# Patient Record
Sex: MALE | Race: BLACK OR AFRICAN AMERICAN | URBAN - METROPOLITAN AREA
[De-identification: names, ages, dates, MRNs, and addresses within clinical notes are randomized per-mention and may not be internally consistent; named-entity substitution may affect disease eponyms.]

---

## 2017-08-02 ENCOUNTER — OFFICE VISIT (OUTPATIENT)
Dept: INTERNAL MEDICINE | Facility: CLINIC | Age: 37
End: 2017-08-02
Payer: COMMERCIAL

## 2017-08-02 ENCOUNTER — RADIANT APPOINTMENT (OUTPATIENT)
Dept: GENERAL RADIOLOGY | Facility: CLINIC | Age: 37
End: 2017-08-02
Attending: INTERNAL MEDICINE
Payer: COMMERCIAL

## 2017-08-02 VITALS
WEIGHT: 129.8 LBS | HEIGHT: 71 IN | HEART RATE: 78 BPM | TEMPERATURE: 98.3 F | DIASTOLIC BLOOD PRESSURE: 66 MMHG | BODY MASS INDEX: 18.17 KG/M2 | SYSTOLIC BLOOD PRESSURE: 104 MMHG | OXYGEN SATURATION: 98 %

## 2017-08-02 DIAGNOSIS — M25.561 PAIN IN BOTH KNEES, UNSPECIFIED CHRONICITY: ICD-10-CM

## 2017-08-02 DIAGNOSIS — Z00.00 ROUTINE GENERAL MEDICAL EXAMINATION AT A HEALTH CARE FACILITY: Primary | ICD-10-CM

## 2017-08-02 DIAGNOSIS — R53.83 FATIGUE, UNSPECIFIED TYPE: ICD-10-CM

## 2017-08-02 DIAGNOSIS — M25.562 PAIN IN BOTH KNEES, UNSPECIFIED CHRONICITY: ICD-10-CM

## 2017-08-02 LAB
ALBUMIN SERPL-MCNC: 4.3 G/DL (ref 3.4–5)
ALP SERPL-CCNC: 70 U/L (ref 40–150)
ALT SERPL W P-5'-P-CCNC: 28 U/L (ref 0–70)
ANION GAP SERPL CALCULATED.3IONS-SCNC: 4 MMOL/L (ref 3–14)
AST SERPL W P-5'-P-CCNC: 23 U/L (ref 0–45)
BILIRUB SERPL-MCNC: 0.4 MG/DL (ref 0.2–1.3)
BUN SERPL-MCNC: 11 MG/DL (ref 7–30)
CALCIUM SERPL-MCNC: 9.2 MG/DL (ref 8.5–10.1)
CHLORIDE SERPL-SCNC: 106 MMOL/L (ref 94–109)
CHOLEST SERPL-MCNC: 182 MG/DL
CO2 SERPL-SCNC: 30 MMOL/L (ref 20–32)
CREAT SERPL-MCNC: 0.74 MG/DL (ref 0.66–1.25)
ERYTHROCYTE [DISTWIDTH] IN BLOOD BY AUTOMATED COUNT: 12.1 % (ref 10–15)
GFR SERPL CREATININE-BSD FRML MDRD: NORMAL ML/MIN/1.7M2
GLUCOSE SERPL-MCNC: 84 MG/DL (ref 70–99)
HCT VFR BLD AUTO: 42.2 % (ref 40–53)
HDLC SERPL-MCNC: 68 MG/DL
HGB BLD-MCNC: 14 G/DL (ref 13.3–17.7)
LDLC SERPL CALC-MCNC: 100 MG/DL
MCH RBC QN AUTO: 29.2 PG (ref 26.5–33)
MCHC RBC AUTO-ENTMCNC: 33.2 G/DL (ref 31.5–36.5)
MCV RBC AUTO: 88 FL (ref 78–100)
NONHDLC SERPL-MCNC: 114 MG/DL
PLATELET # BLD AUTO: 152 10E9/L (ref 150–450)
POTASSIUM SERPL-SCNC: 4 MMOL/L (ref 3.4–5.3)
PROT SERPL-MCNC: 8.3 G/DL (ref 6.8–8.8)
RBC # BLD AUTO: 4.8 10E12/L (ref 4.4–5.9)
SODIUM SERPL-SCNC: 140 MMOL/L (ref 133–144)
TRIGL SERPL-MCNC: 69 MG/DL
TSH SERPL DL<=0.005 MIU/L-ACNC: 2.04 MU/L (ref 0.4–4)
WBC # BLD AUTO: 3.1 10E9/L (ref 4–11)

## 2017-08-02 PROCEDURE — 80053 COMPREHEN METABOLIC PANEL: CPT | Performed by: INTERNAL MEDICINE

## 2017-08-02 PROCEDURE — 80061 LIPID PANEL: CPT | Performed by: INTERNAL MEDICINE

## 2017-08-02 PROCEDURE — 85027 COMPLETE CBC AUTOMATED: CPT | Performed by: INTERNAL MEDICINE

## 2017-08-02 PROCEDURE — 84443 ASSAY THYROID STIM HORMONE: CPT | Performed by: INTERNAL MEDICINE

## 2017-08-02 PROCEDURE — 82306 VITAMIN D 25 HYDROXY: CPT | Performed by: INTERNAL MEDICINE

## 2017-08-02 PROCEDURE — 99395 PREV VISIT EST AGE 18-39: CPT | Performed by: INTERNAL MEDICINE

## 2017-08-02 PROCEDURE — 36415 COLL VENOUS BLD VENIPUNCTURE: CPT | Performed by: INTERNAL MEDICINE

## 2017-08-02 PROCEDURE — 73562 X-RAY EXAM OF KNEE 3: CPT | Mod: RT

## 2017-08-02 NOTE — PROGRESS NOTES
SUBJECTIVE:   CC: Madhav Ling is an 37 year old male who presents for preventative health visit.     Healthy Habits:    Do you get at least three servings of calcium containing foods daily (dairy, green leafy vegetables, etc.)? yes    Amount of exercise or daily activities, outside of work: None    Problems taking medications regularly not applicable    Medication side effects: No    Have you had an eye exam in the past two years? no    Do you see a dentist twice per year? no    Do you have sleep apnea, excessive snoring or daytime drowsiness?yes: daytime drowsiness          PROBLEMS TO ADD ON...    Concern for fatigue, no energy.   Feels also knees cracking with going up / down stairs.     Today's PHQ-2 Score: PHQ-2 ( 1999 Pfizer) 8/2/2017 5/26/2015   Q1: Little interest or pleasure in doing things 0 0   Q2: Feeling down, depressed or hopeless 0 0   PHQ-2 Score 0 0         Abuse: Current or Past(Physical, Sexual or Emotional)- No  Do you feel safe in your environment - Yes  Social History   Substance Use Topics     Smoking status: Never Smoker     Smokeless tobacco: Never Used     Alcohol use No     The patient does not drink >3 drinks per day nor >7 drinks per week.    Last PSA: No results found for: PSA    Reviewed orders with patient. Reviewed health maintenance and updated orders accordingly - Yes  Labs reviewed in EPIC    Reviewed and updated as needed this visit by clinical staff  Tobacco  Allergies  Meds  Med Hx  Surg Hx  Fam Hx  Soc Hx        Reviewed and updated as needed this visit by Provider              ROS:  C: NEGATIVE for fever, chills, change in weight  I: NEGATIVE for worrisome rashes, moles or lesions  E: NEGATIVE for vision changes or irritation  ENT: NEGATIVE for ear, mouth and throat problems  R: NEGATIVE for significant cough or SOB  CV: NEGATIVE for chest pain, palpitations or peripheral edema  GI: NEGATIVE for nausea, abdominal pain, heartburn, or change in bowel habits    "male: negative for dysuria, hematuria, decreased urinary stream, erectile dysfunction, urethral discharge  M: NEGATIVE for significant arthralgias or myalgia  N: NEGATIVE for weakness, dizziness or paresthesias  P: NEGATIVE for changes in mood or affect    OBJECTIVE:   /66  Pulse 78  Temp 98.3  F (36.8  C) (Oral)  Ht 5' 11.25\" (1.81 m)  Wt 129 lb 12.8 oz (58.9 kg)  SpO2 98%  BMI 17.98 kg/m2  EXAM:  GENERAL: healthy, alert and no distress  EYES: Eyes grossly normal to inspection, PERRL and conjunctivae and sclerae normal  HENT: ear canals and TM's normal, nose and mouth without ulcers or lesions  NECK: no adenopathy, no asymmetry, masses, or scars and thyroid normal to palpation  RESP: lungs clear to auscultation - no rales, rhonchi or wheezes  CV: regular rate and rhythm, normal S1 S2, no S3 or S4, no murmur, click or rub, no peripheral edema and peripheral pulses strong  ABDOMEN: soft, nontender, no hepatosplenomegaly, no masses and bowel sounds normal  MS: no gross musculoskeletal defects noted, no edema  SKIN: no suspicious lesions or rashes  NEURO: Normal strength and tone, mentation intact and speech normal  PSYCH: mentation appears normal, affect normal/bright    ASSESSMENT/PLAN:       ICD-10-CM    1. Routine general medical examination at a health care facility Z00.00 Lipid panel reflex to direct LDL     CBC with platelets     Comprehensive metabolic panel     TSH with free T4 reflex     Vitamin D Deficiency   2. Pain in both knees, unspecified chronicity M25.561 XR Knee Bilateral 3 Views    M25.562 Vitamin D Deficiency   3. Fatigue, unspecified type R53.83 CBC with platelets     Comprehensive metabolic panel     TSH with free T4 reflex     Vitamin D Deficiency       COUNSELING:  Reviewed preventive health counseling, as reflected in patient instructions       Regular exercise       Healthy diet/nutrition       Vision screening       Hearing screening       Colon cancer screening       Prostate " "cancer screening         reports that he has never smoked. He has never used smokeless tobacco.      Estimated body mass index is 17.98 kg/(m^2) as calculated from the following:    Height as of this encounter: 5' 11.25\" (1.81 m).    Weight as of this encounter: 129 lb 12.8 oz (58.9 kg).         Counseling Resources:  ATP IV Guidelines  Pooled Cohorts Equation Calculator  FRAX Risk Assessment  ICSI Preventive Guidelines  Dietary Guidelines for Americans, 2010  USDA's MyPlate  ASA Prophylaxis  Lung CA Screening    Johnson Hyman MD  [facility]  "

## 2017-08-02 NOTE — MR AVS SNAPSHOT
After Visit Summary   8/2/2017    Madhav Ling    MRN: [id number]           Patient Information     Date Of Birth          1980        Visit Information        Provider Department      8/2/2017 8:05 AM Johnson Hyman MD; [employer] [facility]        Today's Diagnoses     Routine general medical examination at a health care facility    -  1    Pain in both knees, unspecified chronicity        Fatigue, unspecified type          Care Instructions      Preventive Health Recommendations  Male Ages 26 - 39    Yearly exam:             See your health care provider every year in order to  o   Review health changes.   o   Discuss preventive care.    o   Review your medicines if your doctor has prescribed any.    You should be tested each year for STDs (sexually transmitted diseases), if you re at risk.     After age 35, talk to your provider about cholesterol testing. If you are at risk for heart disease, have your cholesterol tested at least every 5 years.     If you are at risk for diabetes, you should have a diabetes test (fasting glucose).  Shots: Get a flu shot each year. Get a tetanus shot every 10 years.     Nutrition:    Eat at least 5 servings of fruits and vegetables daily.     Eat whole-grain bread, whole-wheat pasta and brown rice instead of white grains and rice.     Talk to your provider about Calcium and Vitamin D.     Lifestyle    Exercise for at least 150 minutes a week (30 minutes a day, 5 days a week). This will help you control your weight and prevent disease.     Limit alcohol to one drink per day.     No smoking.     Wear sunscreen to prevent skin cancer.     See your dentist every six months for an exam and cleaning.             Follow-ups after your visit        Future tests that were ordered for you today     Open Future Orders        Priority Expected Expires Ordered    XR Knee Bilateral 3 Views Routine 8/2/2017 8/2/2018 8/2/2017           "  Who to contact     If you have questions or need follow up information about today's clinic visit or your schedule please contact [facility] directly at [phone].  Normal or non-critical lab and imaging results will be communicated to you by MyChart, letter or phone within 4 business days after the clinic has received the results. If you do not hear from us within 7 days, please contact the clinic through [employer] or phone. If you have a critical or abnormal lab result, we will notify you by phone as soon as possible.  Submit refill requests through [employer] or call your pharmacy and they will forward the refill request to us. Please allow 3 business days for your refill to be completed.          Additional Information About Your Visit        [employer] Information     [employer] lets you send messages to your doctor, view your test results, renew your prescriptions, schedule appointments and more. To sign up, go to [email or web address] . Click on \"Log in\" on the left side of the screen, which will take you to the Welcome page. Then click on \"Sign up Now\" on the right side of the page.     You will be asked to enter the access code listed below, as well as some personal information. Please follow the directions to create your username and password.     Your access code is: [email or web address]  Expires: 10/31/2017  9:11 AM     Your access code will  in 90 days. If you need help or a new code, please call your [facility] clinic or [phone].        Care EveryWhere ID     This is your Care EveryWhere ID. This could be used by other organizations to access your [facility] medical records  [id number]        Your Vitals Were     Pulse Temperature Height Pulse Oximetry BMI (Body Mass Index)       78 98.3  F (36.8  C) (Oral) 5' 11.25\" (1.81 m) 98% 17.98 kg/m2        Blood Pressure from Last 3 Encounters:   17 104/66   05/26/15 118/70    Weight from Last 3 Encounters:   17 129 lb 12.8 oz (58.9 " kg)   05/26/15 134 lb 3.2 oz (60.9 kg)              We Performed the Following     CBC with platelets     Comprehensive metabolic panel     Lipid panel reflex to direct LDL     TSH with free T4 reflex     Vitamin D Deficiency        Primary Care Provider Office Phone # Fax #    Johnson Hyman [phone]       [facility] [address] [facility] [address]        Equal Access to Services     LUBNA AMAYA : Hadii aad ku hadasho Soomaali, waaxda luqadaha, qaybta kaalmada adeegyada, waxay idiin hayaan adeeg kharash la'aan ah. So [facility] [phone].    ATENCIÓN: Si habla español, tiene a younger disposición servicios gratuitos de asistencia lingüística. Llame al [phone].    We comply with applicable federal civil rights laws and [place] laws. We do not discriminate on the basis of race, color, national origin, age, disability sex, sexual orientation or gender identity.            Thank you!     Thank you for choosing [facility]  for your care. Our goal is always to provide you with excellent care. Hearing back from our patients is one way we can continue to improve our services. Please take a few minutes to complete the written survey that you may receive in the mail after your visit with us. Thank you!             Your Updated Medication List - Protect others around you: Learn how to safely use, store and throw away your medicines at www.disposemymeds.org.      Notice  As of 8/2/2017  9:11 AM    You have not been prescribed any medications.

## 2017-08-02 NOTE — NURSING NOTE
"Chief Complaint   Patient presents with     Physical     Fasting.        Initial /66  Pulse 78  Temp 98.3  F (36.8  C) (Oral)  Ht 5' 11.25\" (1.81 m)  Wt 129 lb 12.8 oz (58.9 kg)  SpO2 98%  BMI 17.98 kg/m2 Estimated body mass index is 17.98 kg/(m^2) as calculated from the following:    Height as of this encounter: 5' 11.25\" (1.81 m).    Weight as of this encounter: 129 lb 12.8 oz (58.9 kg).  Medication Reconciliation: complete     Dina Zapata CMA      "

## 2017-08-02 NOTE — LETTER
[facility]  [address], [address]  [place], [place]  [address]                                            TEL:[phone]  FAX:[phone]      Madhav Ling  [address] [facility] [address]      August 7, 2017    Dear Madhav     Low vit D level. Suggest to take daily supplement 2000 units vit D3.   Slightly low WBC count , will recheck in 3 - 4 months.   Rest of the results are normal        Sincerely,      Johnson Hyman M.D.

## 2017-08-03 LAB — DEPRECATED CALCIDIOL+CALCIFEROL SERPL-MC: 17 UG/L (ref 20–75)

## 2017-08-16 ENCOUNTER — TELEPHONE (OUTPATIENT)
Dept: INTERNAL MEDICINE | Facility: CLINIC | Age: 37
End: 2017-08-16

## 2017-08-16 NOTE — TELEPHONE ENCOUNTER
Patient called asking for clarification on lab results and letter. Writer and patient went through the letter and the labs together. Writer educated patient on the labs and the meaning. Writer educate patient on foods to help maintain an optimal cholesterol level pre patient request. Patient verbalized understanding. Patient was advised where he can obtain vitamin D3 tablets. Patient had no further concerns.

## 2018-02-15 ENCOUNTER — HOSPITAL ENCOUNTER (EMERGENCY)
Facility: CLINIC | Age: 38
Discharge: HOME | End: 2018-02-15
Attending: EMERGENCY MEDICINE | Admitting: EMERGENCY MEDICINE

## 2018-02-15 ENCOUNTER — APPOINTMENT (OUTPATIENT)
Dept: GENERAL RADIOLOGY | Facility: CLINIC | Age: 38
End: 2018-02-15
Attending: EMERGENCY MEDICINE

## 2018-02-15 VITALS
WEIGHT: 186.5 LBS | SYSTOLIC BLOOD PRESSURE: 118 MMHG | RESPIRATION RATE: 15 BRPM | DIASTOLIC BLOOD PRESSURE: 79 MMHG | BODY MASS INDEX: 28.36 KG/M2 | HEART RATE: 82 BPM | TEMPERATURE: 98.4 F | OXYGEN SATURATION: 96 %

## 2018-02-15 DIAGNOSIS — R05.9 COUGH: ICD-10-CM

## 2018-02-15 LAB — TROPONIN I SERPL-MCNC: <0.015 UG/L (ref 0–0.04)

## 2018-02-15 PROCEDURE — 93005 ELECTROCARDIOGRAM TRACING: CPT | Performed by: EMERGENCY MEDICINE

## 2018-02-15 PROCEDURE — 99284 EMERGENCY DEPT VISIT MOD MDM: CPT | Mod: 25 | Performed by: EMERGENCY MEDICINE

## 2018-02-15 PROCEDURE — 84484 ASSAY OF TROPONIN QUANT: CPT | Performed by: EMERGENCY MEDICINE

## 2018-02-15 PROCEDURE — 71046 X-RAY EXAM CHEST 2 VIEWS: CPT

## 2018-02-15 PROCEDURE — 99285 EMERGENCY DEPT VISIT HI MDM: CPT | Mod: 25 | Performed by: EMERGENCY MEDICINE

## 2018-02-15 PROCEDURE — 93010 ELECTROCARDIOGRAM REPORT: CPT | Mod: Z6 | Performed by: EMERGENCY MEDICINE

## 2018-02-15 RX ORDER — ALBUTEROL SULFATE 90 UG/1
2 AEROSOL, METERED RESPIRATORY (INHALATION) EVERY 6 HOURS PRN
Qty: 1 INHALER | Refills: 0 | Status: SHIPPED | OUTPATIENT
Start: 2018-02-15

## 2018-02-15 ASSESSMENT — ENCOUNTER SYMPTOMS
COUGH: 1
CHILLS: 0
EYE REDNESS: 0
NECK PAIN: 0
CHEST TIGHTNESS: 1
ABDOMINAL PAIN: 0
VOMITING: 0
FEVER: 0
NECK STIFFNESS: 0
HEADACHES: 1
SHORTNESS OF BREATH: 1
ARTHRALGIAS: 0
CONFUSION: 0
NAUSEA: 0
DIFFICULTY URINATING: 0
COLOR CHANGE: 0

## 2018-02-15 NOTE — ED AVS SNAPSHOT
[facility], Emergency Department    [address]    [place] [address]    Phone:  [phone]    Fax:  [phone]                                       Michael Raymond   MRN: [id number]    Department:  [facility], Emergency Department   Date of Visit:  2/15/2018           Patient Information     Date Of Birth          1980        Your diagnoses for this visit were:     Cough        You were seen by Demario Gallegos MD.        Discharge Instructions       Please make an appointment to follow up with Your Primary Care Provider as soon as possible to discuss symptom and chest pain.  If you develop new or worsening chest pain please return to the emergency department for repeat evaluation.    Would recommend albuterol inhaler 2 puffs every 8 hours for symptomatic relief.  If no improvement can discontinue use.      24 Hour Appointment Hotline       To make an appointment at any [facility], call [phone] ([phone]). If you don't have a family doctor or clinic, we will help you find one. [facility] clinics are conveniently located to serve the needs of you and your family.             Review of your medicines      START taking        Dose / Directions Last dose taken    albuterol 108 (90 BASE) MCG/ACT Inhaler   Commonly known as:  PROAIR HFA/PROVENTIL HFA/VENTOLIN HFA   Dose:  2 puff   Quantity:  1 Inhaler        Inhale 2 puffs into the lungs every 6 hours as needed for shortness of breath / dyspnea or wheezing   Refills:  0          Our records show that you are taking the medicines listed below. If these are incorrect, please call your family doctor or clinic.        Dose / Directions Last dose taken    NYQUIL PO        Refills:  0        TYLENOL PO        Refills:  0                Prescriptions were sent or printed at these locations (1 Prescription)                   Other Prescriptions                Printed at Department/Unit printer (1 of 1)         albuterol (PROAIR  "HFA/PROVENTIL HFA/VENTOLIN HFA) 108 (90 BASE) MCG/ACT Inhaler                Procedures and tests performed during your visit     EKG 12-lead, tracing only    Troponin I    XR Chest 2 Views      Orders Needing Specimen Collection     None      Pending Results     Date and Time Order Name Status Description    2/15/2018 1821 EKG 12-lead, tracing only Preliminary             Pending Culture Results     No orders found from 2018 to 2018.            Pending Results Instructions     If you had any lab results that were not finalized at the time of your Discharge, you can call the ED Lab Result RN at [phone]. You will be contacted by this team for any positive Lab results or changes in treatment. The nurses are available 7 days a week from 10A to 6:30P.  You can leave a message 24 hours per day and they will return your call.        Thank you for choosing [facility]       Thank you for choosing [facility] for your care. Our goal is always to provide you with excellent care. Hearing back from our patients is one way we can continue to improve our services. Please take a few minutes to complete the written survey that you may receive in the mail after you visit with us. Thank you!        [employer] Information     [employer] lets you send messages to your doctor, view your test results, renew your prescriptions, schedule appointments and more. To sign up, go to [email or web address] . Click on \"Log in\" on the left side of the screen, which will take you to the Welcome page. Then click on \"Sign up Now\" on the right side of the page.     You will be asked to enter the access code listed below, as well as some personal information. Please follow the directions to create your username and password.     Your access code is: [email or web address]  Expires: 2018  8:14 PM     Your access code will  in 90 days. If you need help or a new code, please call your [facility] clinic or [phone].        Care EveryWhere ID     " This is your Care EveryWhere ID. This could be used by other organizations to access your [facility] medical records  [id number]        Equal Access to Services     SHELLY DOZIER : Benita Gonzalez, conner sanchez, celestino morley, eduin rollins. So [facility] [phone].    ATENCIÓN: Si habla español, tiene a hernadez disposición servicios gratuitos de asistencia lingüística. Llame al [phone].    We comply with applicable federal civil rights laws and [place] laws. We do not discriminate on the basis of race, color, national origin, age, disability, sex, sexual orientation, or gender identity.            After Visit Summary       This is your record. Keep this with you and show to your community pharmacist(s) and doctor(s) at your next visit.

## 2018-02-15 NOTE — ED AVS SNAPSHOT
[facility], Emergency Department    [address]    [facility] [place] [address]    Phone:  [phone]    Fax:  [phone]                                       Michael Raymond   MRN: [id number]    Department:  [facility], Emergency Department   Date of Visit:  2/15/2018           After Visit Summary Signature Page     I have received my discharge instructions, and my questions have been answered. I have discussed any challenges I see with this plan with the nurse or doctor.    ..........................................................................................................................................  Patient/Patient Representative Signature      ..........................................................................................................................................  Patient Representative Print Name and Relationship to Patient    ..................................................               ................................................  Date                                            Time    ..........................................................................................................................................  Reviewed by Signature/Title    ...................................................              ..............................................  Date                                                            Time

## 2018-02-16 LAB — INTERPRETATION ECG - MUSE: NORMAL

## 2018-02-16 NOTE — DISCHARGE INSTRUCTIONS
Please make an appointment to follow up with Your Primary Care Provider as soon as possible to discuss symptom and chest pain.  If you develop new or worsening chest pain please return to the emergency department for repeat evaluation.    Would recommend albuterol inhaler 2 puffs every 8 hours for symptomatic relief.  If no improvement can discontinue use.

## 2018-02-16 NOTE — ED PROVIDER NOTES
History     Chief Complaint   Patient presents with     Cough     Patient reports intermittent cough since Tuesday, reports increased cough with working/movement.  Has been taking Nyquil with minimal relief     HPI  Tonjagamagagan Raymond is a 38 year old male who presents to the ED for the evaluation of a nonproductive cough. The patient states that he has been coughing now for the past two days, and it is worse with exertion or going out in cold weather. No chest pain with this, but he does note that on 2/13/18, 2 days ago, he had an increase in cough concurrent with chest pressure and mild SOB, which was unusual. He has not since had that symptomatolgy associated with his cough, and he denies any ongoing SOB.  No other history of exertional chest pain.  He does have a headache associated with coughing, but he denies any neck pain or nuchal rigidity. No previous history of heart disease (either personal or family), HTN, hypercholesterolemia, or thromboembolism. No LE edema currently, and he denies any recent long travel or surgeries, no hemoptysis. The patient is not diabetic and he is a non smoker. No nausea or vomiting, fever, myalgias or arthralgias..      PAST MEDICAL HISTORY: History reviewed. No pertinent past medical history.    PAST SURGICAL HISTORY: History reviewed. No pertinent surgical history.    FAMILY HISTORY: No family history on file.    SOCIAL HISTORY:   Social History   Substance Use Topics     Smoking status: Never Smoker     Smokeless tobacco: Never Used     Alcohol use No       Patient's Medications   New Prescriptions    ALBUTEROL (PROAIR HFA/PROVENTIL HFA/VENTOLIN HFA) 108 (90 BASE) MCG/ACT INHALER    Inhale 2 puffs into the lungs every 6 hours as needed for shortness of breath / dyspnea or wheezing   Previous Medications    ACETAMINOPHEN (TYLENOL PO)        PSEUDOEPH-DOXYLAMINE-DM-APAP (NYQUIL PO)       Modified Medications    No medications on file   Discontinued Medications    BENZONATATE  (TESSALON) 200 MG CAPSULE    Take 1 capsule (200 mg) by mouth 3 times daily as needed for cough        No Known Allergies          I have reviewed the Medications, Allergies, Past Medical and Surgical History, and Social History in the Epic system.    Review of Systems   Constitutional: Negative for chills and fever.   HENT: Negative for congestion.    Eyes: Negative for redness.   Respiratory: Positive for cough (nonproductive), chest tightness (once, see HPI) and shortness of breath (once, see HPI).    Cardiovascular: Negative for chest pain.   Gastrointestinal: Negative for abdominal pain, nausea and vomiting.   Genitourinary: Negative for difficulty urinating.   Musculoskeletal: Negative for arthralgias, neck pain and neck stiffness.   Skin: Negative for color change.   Neurological: Positive for headaches.   Psychiatric/Behavioral: Negative for confusion.       Physical Exam   BP: 136/73  Pulse: 84  Temp: 97.5  F (36.4  C)  Resp: 16  Weight: 84.6 kg (186 lb 8 oz)  SpO2: 96 %      Physical Exam  General: awake, alert, NAD  Head: normal cephalic  HEENT: pupils equal, conjugate gaze in tact  Neck: Supple  CV: regular rate and rhythm without murmur  Lungs: clear to ascultation.  No rales rhonchi or wheezing.  No increased work of breathing  Abd: soft, non-tender, no guarding, no peritoneal signs  EXT: lower extremities without swelling or edema  Neuro: awake, answers questions appropriately. No focal deficits noted       ED Course     Results for orders placed or performed during the hospital encounter of 02/15/18 (from the past 24 hour(s))   Troponin I   Result Value Ref Range    Troponin I ES <0.015 0.000 - 0.045 ug/L   EKG 12-lead, tracing only   Result Value Ref Range    Interpretation ECG Click View Image link to view waveform and result    XR Chest 2 Views    Narrative    CHEST TWO VIEWS  2/15/2018 7:02 PM     HISTORY: Cough.    COMPARISON: None.      Impression    IMPRESSION: Shallow inspiration. No acute  infiltrates. Probably normal  heart size and pulmonary vascularity given the expiratory nature of  the radiographs.       Procedures             EKG Interpretation:      Interpreted by Demario Gallegos  Time reviewed: 1840  Symptoms at time of EKG: none  Rhythm: normal sinus   Rate: normal  Axis: normal  Ectopy: none  Conduction: normal  ST Segments/ T Waves: No ST-T wave changes  Q Waves: none  Comparison to prior: No old EKG available    Clinical Impression: No signs of acute ischemia          Critical Care time:  none       Labs Ordered and Resulted from Time of ED Arrival Up to the Time of Departure from the ED   TROPONIN I            Assessments & Plan (with Medical Decision Making)   Michael is a 38-year-old male without significant past medical history who presents with 2 days of nonproductive cough.  He did complain of one episode of shortness of breath and chest pressure 2 days ago that has not reoccurred. Differential of potentially life-threatening pathology would include ACS, PE, other potential underlying causes include viral URI, bacterial URI, CHF.    Patient is well-appearing.  Stable vital signs.  Does not endorse fever, myalgias arthralgias or other associated symptoms so  low suspicion for influenza or bacterial infection.  Did consider ACS as a potentially life-threatening pathology given his chest pressure 2 days ago though the patient has atypical history without significant risk factors for ACS.  EKG shows no signs of acute ischemia.  Troponin is negative.  Patient has a HEART score of 0 making him low risk for all cause mortality in the next 30 days so I think he is safe to discharge home and he can follow-up with primary care to see if further evaluation or risk statification is warranted.  Patient is PERC negative so very low suspicion that his cough is being secondary to PE.     Chest x-ray without evidence of cardiomegaly or fluid overload.  No signs of pneumonia or infection.    Given the  negative evaluation suspect this is largely related to viral URI will recommend symptomatic management will also prescribe an albuterol inhaler in case this helps with symptom management.  If the patient does not have any relief from his albuterol inhaler he can discontinue the use.  Would recommend he follows up with the primary care for repeat evaluation.  If he gets new or worsening chest pain or shortness of breath he needs to return to the emergency department.    I have reviewed the nursing notes.    I have reviewed the findings, diagnosis, plan and need for follow up with the patient.    New Prescriptions    ALBUTEROL (PROAIR HFA/PROVENTIL HFA/VENTOLIN HFA) 108 (90 BASE) MCG/ACT INHALER    Inhale 2 puffs into the lungs every 6 hours as needed for shortness of breath / dyspnea or wheezing       Final diagnoses:   Cough   I, Nick Juarez, am serving as a trained medical scribe to document services personally performed by Demario Gallegos MD, based on the provider's statements to me.      Demario GARCIA MD, was physically present and have reviewed and verified the accuracy of this note documented by Nick Juarez.       2/15/2018   [facility], EMERGENCY DEPARTMENT     Demario Gallegos MD  02/15/18 1941       Demario Gallegos MD  02/15/18 2023

## 2018-02-16 NOTE — ED NOTES
Patient reports intermittent cough since Tuesday, reports cough increases with activity/temperature changes.  Patient denies body aches, reports headache with coughing.  Patient reports BUDDY at times when coughing.

## 2025-01-03 ENCOUNTER — HOSPITAL ENCOUNTER (EMERGENCY)
Facility: CLINIC | Age: 45
Discharge: HOME | End: 2025-01-03
Attending: EMERGENCY MEDICINE | Admitting: EMERGENCY MEDICINE
Payer: COMMERCIAL

## 2025-01-03 VITALS
DIASTOLIC BLOOD PRESSURE: 73 MMHG | OXYGEN SATURATION: 100 % | HEART RATE: 88 BPM | BODY MASS INDEX: 19.55 KG/M2 | HEIGHT: 69 IN | SYSTOLIC BLOOD PRESSURE: 111 MMHG | WEIGHT: 132 LBS | RESPIRATION RATE: 16 BRPM | TEMPERATURE: 98.6 F

## 2025-01-03 DIAGNOSIS — J10.1 INFLUENZA A: ICD-10-CM

## 2025-01-03 LAB
FLUAV RNA SPEC QL NAA+PROBE: POSITIVE
FLUBV RNA RESP QL NAA+PROBE: NEGATIVE
RSV RNA SPEC NAA+PROBE: NEGATIVE
SARS-COV-2 RNA RESP QL NAA+PROBE: NEGATIVE

## 2025-01-03 PROCEDURE — 87637 SARSCOV2&INF A&B&RSV AMP PRB: CPT | Performed by: EMERGENCY MEDICINE

## 2025-01-03 PROCEDURE — 99283 EMERGENCY DEPT VISIT LOW MDM: CPT

## 2025-01-03 ASSESSMENT — ACTIVITIES OF DAILY LIVING (ADL)
ADLS_ACUITY_SCORE: 41
ADLS_ACUITY_SCORE: 41

## 2025-01-03 NOTE — ED PROVIDER NOTES
"  Emergency Department Note      History of Present Illness     Chief Complaint   Flu Symptoms      HPI   Madhav Ling is a 45 year old male who presents with concern of a cough, fever and bodyaches.  He has had this for 3 days.  He is a [employer] .  Patient has not taken anything for this.  He is otherwise healthy.  Patient did not get flu shot this year.    Independent Historian   None    Review of External Notes   none    Past Medical History     Medical History and Problem List   No past medical history on file.    Medications   No current outpatient medications on file.      Surgical History   No past surgical history on file.    Physical Exam     Patient Vitals for the past 24 hrs:   BP Temp Temp src Pulse Resp SpO2 Height Weight   01/03/25 1315 111/73 98.6  F (37  C) Oral 88 16 100 % 1.753 m (5' 9\") 59.9 kg (132 lb)     Physical Exam  General:  Sitting on bed.  HENT:  No obvious trauma to head  Right Ear:  External ear normal.   Left Ear:  External ear normal.   Nose:  Nose normal.   Eyes:  Conjunctivae and EOM are normal. Pupils are equal, round, and reactive.   Neck: Normal range of motion. Neck supple. No tracheal deviation present.   CV:  Normal heart sounds. No murmur heard.  Pulm/Chest: Effort normal and breath sounds normal.  No wheezing or coarse breath sounds bilateral.  Talking in full sentences.  M/S: Normal range of motion.   Neuro: Awake and alert.   Skin: Skin is warm and dry. No rash noted. Not diaphoretic.   Psych: Normal mood and affect. Behavior is normal.     Diagnostics     Lab Results   Labs Ordered and Resulted from Time of ED Arrival to Time of ED Departure   INFLUENZA A/B, RSV AND SARS-COV2 PCR - Abnormal       Result Value    Influenza A PCR Positive (*)     Influenza B PCR Negative      RSV PCR Negative      SARS CoV2 PCR Negative         Imaging   No orders to display       Independent Interpretation   None    ED Course      Medications Administered   Medications - No data to " display    Procedures   Procedures     Discussion of Management   None    ED Course        Additional Documentation  None    Medical Decision Making / Diagnosis     CMS Diagnoses: None    MIPS       None    MDM   Madhav Ling is a very pleasant 45 year old male who presents with concern of a fever, headache, body aches and cough.  Influenza A test is positive.  COVID and RSV are negative.  Patient is not hypoxic.  Lungs are clear bilateral so doubt secondary bacterial pneumonia.  I do not believe a chest x-ray is necessary at this time.  Patient has had symptoms for 3 days.  He is out of the Tamiflu window.  I recommended symptomatic treatment with Tylenol every 4 hours and ibuprofen every 6 hours.  Discussed reasons to return.  Patient had no additional concerns.  He has no meningeal signs to suggest meningitis.  No GI symptoms with this.  No nausea, vomiting or diarrhea.  Discussed reasons return and encourage close follow-up in a few days with PCP.    The treatment plan was discussed with the patient and they expressed understanding of this plan and consented to the plan.  In addition, the patient will return to the emergency department if their symptoms persist, worsen, if new symptoms arise or if there is any concern as other pathology may be present that is not evident at this time. They also understand the importance of close follow up in the clinic and if unable to do so will return to the emergency department for a reevaluation. All questions were answered.    Disposition   The patient was discharged.     Diagnosis     ICD-10-CM    1. Influenza A  J10.1            Discharge Medications   New Prescriptions    No medications on file         DO Bebo Huynh, Guillaume Alberto DO  01/03/25 7301

## 2025-01-03 NOTE — ED TRIAGE NOTES
Patient is an [employer]  and started to have headache, fever for the last 3 days. Patient has been taking tylenol and ibuprofen, last had it this morning.      Triage Assessment (Adult)       Row Name 01/03/25 1316          Triage Assessment    Airway WDL WDL        Respiratory WDL    Respiratory WDL WDL        Skin Circulation/Temperature WDL    Skin Circulation/Temperature WDL WDL        Cardiac WDL    Cardiac WDL WDL        Peripheral/Neurovascular WDL    Peripheral Neurovascular WDL X  headache        Cognitive/Neuro/Behavioral WDL    Cognitive/Neuro/Behavioral WDL WDL

## 2025-03-19 ENCOUNTER — OFFICE VISIT (OUTPATIENT)
Dept: INTERNAL MEDICINE | Facility: CLINIC | Age: 45
End: 2025-03-19
Payer: COMMERCIAL

## 2025-03-19 VITALS
OXYGEN SATURATION: 100 % | HEART RATE: 69 BPM | SYSTOLIC BLOOD PRESSURE: 126 MMHG | TEMPERATURE: 96.2 F | BODY MASS INDEX: 20.79 KG/M2 | HEIGHT: 69 IN | RESPIRATION RATE: 14 BRPM | WEIGHT: 140.4 LBS | DIASTOLIC BLOOD PRESSURE: 77 MMHG

## 2025-03-19 DIAGNOSIS — Z00.00 ENCOUNTER FOR PREVENTATIVE ADULT HEALTH CARE EXAMINATION: Primary | ICD-10-CM

## 2025-03-19 DIAGNOSIS — Z11.59 NEED FOR HEPATITIS C SCREENING TEST: ICD-10-CM

## 2025-03-19 DIAGNOSIS — Z11.4 SCREENING FOR HIV (HUMAN IMMUNODEFICIENCY VIRUS): ICD-10-CM

## 2025-03-19 LAB
ALBUMIN UR-MCNC: NEGATIVE MG/DL
APPEARANCE UR: CLEAR
BACTERIA #/AREA URNS HPF: ABNORMAL /HPF
BILIRUB UR QL STRIP: NEGATIVE
COLOR UR AUTO: YELLOW
ERYTHROCYTE [DISTWIDTH] IN BLOOD BY AUTOMATED COUNT: 11.9 % (ref 10–15)
GLUCOSE UR STRIP-MCNC: NEGATIVE MG/DL
HCT VFR BLD AUTO: 43.8 % (ref 40–53)
HGB BLD-MCNC: 15 G/DL (ref 13.3–17.7)
HGB UR QL STRIP: ABNORMAL
KETONES UR STRIP-MCNC: NEGATIVE MG/DL
LEUKOCYTE ESTERASE UR QL STRIP: NEGATIVE
MCH RBC QN AUTO: 30.2 PG (ref 26.5–33)
MCHC RBC AUTO-ENTMCNC: 34.2 G/DL (ref 31.5–36.5)
MCV RBC AUTO: 88 FL (ref 78–100)
NITRATE UR QL: NEGATIVE
PH UR STRIP: 6 [PH] (ref 5–7)
PLATELET # BLD AUTO: 192 10E3/UL (ref 150–450)
RBC # BLD AUTO: 4.96 10E6/UL (ref 4.4–5.9)
RBC #/AREA URNS AUTO: ABNORMAL /HPF
SP GR UR STRIP: 1.02 (ref 1–1.03)
SQUAMOUS #/AREA URNS AUTO: ABNORMAL /LPF
UROBILINOGEN UR STRIP-ACNC: 0.2 E.U./DL
WBC # BLD AUTO: 3.2 10E3/UL (ref 4–11)
WBC #/AREA URNS AUTO: ABNORMAL /HPF

## 2025-03-19 PROCEDURE — 1125F AMNT PAIN NOTED PAIN PRSNT: CPT | Performed by: INTERNAL MEDICINE

## 2025-03-19 PROCEDURE — 3074F SYST BP LT 130 MM HG: CPT | Performed by: INTERNAL MEDICINE

## 2025-03-19 PROCEDURE — 81001 URINALYSIS AUTO W/SCOPE: CPT | Performed by: INTERNAL MEDICINE

## 2025-03-19 PROCEDURE — 36415 COLL VENOUS BLD VENIPUNCTURE: CPT | Performed by: INTERNAL MEDICINE

## 2025-03-19 PROCEDURE — 3078F DIAST BP <80 MM HG: CPT | Performed by: INTERNAL MEDICINE

## 2025-03-19 PROCEDURE — 99386 PREV VISIT NEW AGE 40-64: CPT | Performed by: INTERNAL MEDICINE

## 2025-03-19 PROCEDURE — 85027 COMPLETE CBC AUTOMATED: CPT | Performed by: INTERNAL MEDICINE

## 2025-03-19 RX ORDER — VITAMIN B COMPLEX
TABLET ORAL DAILY
COMMUNITY

## 2025-03-19 SDOH — HEALTH STABILITY: PHYSICAL HEALTH: ON AVERAGE, HOW MANY DAYS PER WEEK DO YOU ENGAGE IN MODERATE TO STRENUOUS EXERCISE (LIKE A BRISK WALK)?: 1 DAY

## 2025-03-19 ASSESSMENT — SOCIAL DETERMINANTS OF HEALTH (SDOH): HOW OFTEN DO YOU GET TOGETHER WITH FRIENDS OR RELATIVES?: ONCE A WEEK

## 2025-03-19 ASSESSMENT — PAIN SCALES - GENERAL: PAINLEVEL_OUTOF10: MODERATE PAIN (6)

## 2025-03-19 NOTE — PROGRESS NOTES
Preventive Care Visit  [facility]  Johnson Hyman MD, Internal Medicine  Mar 19, 2025      Assessment & Plan     Screening for HIV (human immunodeficiency virus)    - HIV Antigen Antibody Combo    Need for hepatitis C screening test    - Hepatitis C Screen Reflex to HCV RNA Quant and Genotype    Encounter for preventative adult health care examination  advised regular aerobic activity, low cholesterol, low salt diet, wearing seat belt,  self examinations, sunscreen protection.Obtain screening cholesterol, immunizations reviewed.    - Lipid panel reflex to direct LDL Non-fasting  - CBC with platelets  - Comprehensive metabolic panel (BMP + Alb, Alk Phos, ALT, AST, Total. Bili, TP)  - TSH with free T4 reflex  - UA with Microscopic reflex to Culture - lab collect  - Vitamin D Deficiency    Patient has been advised of split billing requirements and indicates understanding: Yes        Counseling  Appropriate preventive services were addressed with this patient via screening, questionnaire, or discussion as appropriate for fall prevention, nutrition, physical activity, Tobacco-use cessation, social engagement, weight loss and cognition.  Checklist reviewing preventive services available has been given to the patient.  Reviewed patient's diet, addressing concerns and/or questions.   He is at risk for lack of exercise and has been provided with information to increase physical activity for the benefit of his well-being.   The patient was instructed to see the dentist every 6 months.       See Patient Instructions    Elicia Corey is a 45 year old, presenting for the following:  Physical (fasting)        3/19/2025    10:53 AM   Additional Questions   Roomed by Renuka LE   Accompanied by n/a          HPI    Reports back pain, flanks, with twisting of his body. No fever, dysuria, bowel problems.            Advance Care Planning  Patient does not have a Health Care Directive: Discussed  advance care planning with patient; however, patient declined at this time.      3/19/2025   General Health   How would you rate your overall physical health? (!) DECLINE   Feel stress (tense, anxious, or unable to sleep) Only a little   (!) STRESS CONCERN      3/19/2025   Nutrition   Three or more servings of calcium each day? (!) I DON'T KNOW   Diet: Regular (no restrictions)   How many servings of fruit and vegetables per day? (!) 2-3   How many sweetened beverages each day? 0-1         3/19/2025   Exercise   Days per week of moderate/strenous exercise 1 day   (!) EXERCISE CONCERN      3/19/2025   Social Factors   Frequency of gathering with friends or relatives Once a week   Worry food won't last until get money to buy more No   Food not last or not have enough money for food? No   Do you have housing? (Housing is defined as stable permanent housing and does not include staying ouside in a car, in a tent, in an abandoned building, in an overnight shelter, or couch-surfing.) No   Are you worried about losing your housing? No   Lack of transportation? No   Unable to get utilities (heat,electricity)? No   Want help with housing or utility concern? No   (!) HOUSING CONCERN PRESENT      3/19/2025   Dental   Dentist two times every year? (!) NO           Today's PHQ-2 Score:       3/19/2025    10:58 AM   PHQ-2 ( 1999 [employer])   Q1: Little interest or pleasure in doing things 0   Q2: Feeling down, depressed or hopeless 0   PHQ-2 Score 0           3/19/2025   Substance Use   Alcohol more than 3/day or more than 7/wk No   Do you use any other substances recreationally? No     Social History     Tobacco Use    Smoking status: Never    Smokeless tobacco: Never   Vaping Use    Vaping status: Never Used   Substance Use Topics    Alcohol use: No    Drug use: No             3/19/2025   One time HIV Screening   Previous HIV test? No         3/19/2025   STI Screening   New sexual partner(s) since last STI/HIV test? No   ASCVD  "Risk   The ASCVD Risk score (Mira RODRIGUEZ, et al., 2019) failed to calculate for the following reasons:    Cannot find a previous HDL lab    Cannot find a previous total cholesterol lab        3/19/2025   Contraception/Family Planning   Questions about contraception or family planning No        Reviewed and updated as needed this visit by Provider                    Lab work is in process  Labs reviewed in EPIC      Review of Systems  CONSTITUTIONAL: NEGATIVE for fever, chills, change in weight  INTEGUMENTARY/SKIN: NEGATIVE for worrisome rashes, moles or lesions  EYES: NEGATIVE for vision changes or irritation  ENT/MOUTH: NEGATIVE for ear, mouth and throat problems  RESP: NEGATIVE for significant cough or SOB  BREAST: NEGATIVE for masses, tenderness or discharge  CV: NEGATIVE for chest pain, palpitations or peripheral edema  GI: NEGATIVE for nausea, abdominal pain, heartburn, or change in bowel habits  : NEGATIVE for frequency, dysuria, or hematuria  MUSCULOSKELETAL: NEGATIVE for significant arthralgias or myalgia  NEURO: NEGATIVE for weakness, dizziness or paresthesias  ENDOCRINE: NEGATIVE for temperature intolerance, skin/hair changes  HEME: NEGATIVE for bleeding problems  PSYCHIATRIC: NEGATIVE for changes in mood or affect     Objective    Exam  /77 (BP Location: Right arm, Cuff Size: Adult Regular)   Pulse 69   Temp (!) 96.2  F (35.7  C) (Tympanic)   Resp 14   Ht 1.753 m (5' 9\")   Wt 63.7 kg (140 lb 6.4 oz)   SpO2 100%   BMI 20.73 kg/m     Estimated body mass index is 20.73 kg/m  as calculated from the following:    Height as of this encounter: 1.753 m (5' 9\").    Weight as of this encounter: 63.7 kg (140 lb 6.4 oz).    Physical Exam  GENERAL: alert and no distress  EYES: Eyes grossly normal to inspection, PERRL and conjunctivae and sclerae normal  HENT: ear canals and TM's normal, nose and mouth without ulcers or lesions  NECK: no adenopathy, no asymmetry, masses, or scars  RESP: lungs " clear to auscultation - no rales, rhonchi or wheezes  CV: regular rate and rhythm, normal S1 S2, no S3 or S4, no murmur, click or rub, no peripheral edema  ABDOMEN: soft, nontender, no hepatosplenomegaly, no masses and bowel sounds normal  MS: no gross musculoskeletal defects noted, no edema  SKIN: no suspicious lesions or rashes  NEURO: Normal strength and tone, mentation intact and speech normal  PSYCH: mentation appears normal, affect normal/bright        Signed Electronically by: Johnson Hyman MD

## 2025-03-20 LAB
ALBUMIN SERPL BCG-MCNC: 4.7 G/DL (ref 3.5–5.2)
ALP SERPL-CCNC: 83 U/L (ref 40–150)
ALT SERPL W P-5'-P-CCNC: 24 U/L (ref 0–70)
ANION GAP SERPL CALCULATED.3IONS-SCNC: 10 MMOL/L (ref 7–15)
AST SERPL W P-5'-P-CCNC: 29 U/L (ref 0–45)
BILIRUB SERPL-MCNC: 0.2 MG/DL
BUN SERPL-MCNC: 15.3 MG/DL (ref 6–20)
CALCIUM SERPL-MCNC: ABNORMAL MG/DL
CHLORIDE SERPL-SCNC: 106 MMOL/L (ref 98–107)
CHOLEST SERPL-MCNC: 220 MG/DL
CREAT SERPL-MCNC: 0.82 MG/DL (ref 0.67–1.17)
EGFRCR SERPLBLD CKD-EPI 2021: >90 ML/MIN/1.73M2
FASTING STATUS PATIENT QL REPORTED: YES
FASTING STATUS PATIENT QL REPORTED: YES
GLUCOSE SERPL-MCNC: 106 MG/DL (ref 70–99)
HCO3 SERPL-SCNC: 24 MMOL/L (ref 22–29)
HCV AB SERPL QL IA: NONREACTIVE
HDLC SERPL-MCNC: 64 MG/DL
HIV 1+2 AB+HIV1 P24 AG SERPL QL IA: NONREACTIVE
LDLC SERPL CALC-MCNC: 140 MG/DL
NONHDLC SERPL-MCNC: 156 MG/DL
POTASSIUM SERPL-SCNC: 4.6 MMOL/L (ref 3.4–5.3)
PROT SERPL-MCNC: 8.3 G/DL (ref 6.4–8.3)
SODIUM SERPL-SCNC: 140 MMOL/L (ref 135–145)
TRIGL SERPL-MCNC: 78 MG/DL
TSH SERPL DL<=0.005 MIU/L-ACNC: 2.57 UIU/ML (ref 0.3–4.2)
VIT D+METAB SERPL-MCNC: 40 NG/ML (ref 20–50)

## 2025-03-31 ENCOUNTER — TELEPHONE (OUTPATIENT)
Dept: INTERNAL MEDICINE | Facility: CLINIC | Age: 45
End: 2025-03-31
Payer: COMMERCIAL

## 2025-03-31 DIAGNOSIS — M54.50 BILATERAL LOW BACK PAIN WITHOUT SCIATICA, UNSPECIFIED CHRONICITY: Primary | ICD-10-CM

## 2025-03-31 NOTE — TELEPHONE ENCOUNTER
"Ronel  ID# [id number]    Left voicemail for patient to call clinic back. Per 3/19/25 labs \"Slightly elevated cholesterol. Keep diet and exercise.  Rest of the results are normal.\" Letter was sent out, not sure what kind of questions patient will have.     Summer RN 2:48 PM March 31, 2025   [facility]      "

## 2025-03-31 NOTE — TELEPHONE ENCOUNTER
Test Results    Contacts       Contact Date/Time Type Contact Phone/Fax    03/31/2025 01:46 PM CDT Phone (Incoming) Madhav Ling (Self) [phone] ()            Who ordered the test:  PCP    Type of test: Lab    Date of test:  3/19/25    Where was the test performed:  [facility]    What are your questions/concerns?:  Patient would like a call back about his results     Okay to leave a detailed message?: Yes at Home number on file [phone] ([employer])

## 2025-04-01 NOTE — TELEPHONE ENCOUNTER
Back pain is likely musculoskeletal. Recommend PT if persistent. Will refer.   His test results are normal.

## 2025-04-01 NOTE — TELEPHONE ENCOUNTER
Ronel  ID# [id number]    Patient reports he was calling to know results of lab tests that was collected. Looks like results never was received.     Advised patient of provider recommendation via telephone. Main reason patient went to the doctor he is having kidney pain, he wanted to go over urine test. Writer discussed UA with patient and ultimately advised UA negative for UTI. Patient declined mailed results.     Patient is still having some flank pain, patient wants to know next steps since labs are normal.     Summer RN 9:36 AM April 1, 2025   [facility]

## 2025-04-01 NOTE — TELEPHONE ENCOUNTER
Ronel  ID# [id number]    Advised patient of provider recommendation via telephone.     Summer RN 11:58 AM April 1, 2025   [facility]